# Patient Record
Sex: FEMALE | Race: OTHER | NOT HISPANIC OR LATINO
[De-identification: names, ages, dates, MRNs, and addresses within clinical notes are randomized per-mention and may not be internally consistent; named-entity substitution may affect disease eponyms.]

---

## 2019-06-28 ENCOUNTER — RESULT REVIEW (OUTPATIENT)
Age: 54
End: 2019-06-28

## 2019-09-15 ENCOUNTER — APPOINTMENT (OUTPATIENT)
Dept: MRI IMAGING | Facility: HOSPITAL | Age: 54
End: 2019-09-15

## 2020-09-16 ENCOUNTER — RESULT REVIEW (OUTPATIENT)
Age: 55
End: 2020-09-16

## 2020-09-27 ENCOUNTER — TRANSCRIPTION ENCOUNTER (OUTPATIENT)
Age: 55
End: 2020-09-27

## 2021-04-08 ENCOUNTER — RESULT REVIEW (OUTPATIENT)
Age: 56
End: 2021-04-08

## 2021-09-17 ENCOUNTER — RESULT REVIEW (OUTPATIENT)
Age: 56
End: 2021-09-17

## 2021-11-16 ENCOUNTER — TRANSCRIPTION ENCOUNTER (OUTPATIENT)
Age: 56
End: 2021-11-16

## 2021-11-18 ENCOUNTER — TRANSCRIPTION ENCOUNTER (OUTPATIENT)
Age: 56
End: 2021-11-18

## 2022-04-04 ENCOUNTER — APPOINTMENT (OUTPATIENT)
Dept: ULTRASOUND IMAGING | Facility: CLINIC | Age: 57
End: 2022-04-04

## 2022-04-11 ENCOUNTER — OUTPATIENT (OUTPATIENT)
Dept: OUTPATIENT SERVICES | Facility: HOSPITAL | Age: 57
LOS: 1 days | End: 2022-04-11
Payer: COMMERCIAL

## 2022-04-11 ENCOUNTER — APPOINTMENT (OUTPATIENT)
Dept: ULTRASOUND IMAGING | Facility: CLINIC | Age: 57
End: 2022-04-11
Payer: COMMERCIAL

## 2022-04-11 DIAGNOSIS — R10.11 RIGHT UPPER QUADRANT PAIN: ICD-10-CM

## 2022-04-11 PROCEDURE — 76705 ECHO EXAM OF ABDOMEN: CPT | Mod: 26

## 2022-04-11 PROCEDURE — 76705 ECHO EXAM OF ABDOMEN: CPT

## 2022-09-28 ENCOUNTER — RESULT REVIEW (OUTPATIENT)
Age: 57
End: 2022-09-28

## 2022-11-29 ENCOUNTER — APPOINTMENT (OUTPATIENT)
Dept: ORTHOPEDIC SURGERY | Facility: CLINIC | Age: 57
End: 2022-11-29

## 2022-11-29 VITALS — BODY MASS INDEX: 25.44 KG/M2 | WEIGHT: 149 LBS | HEIGHT: 64 IN

## 2022-11-29 DIAGNOSIS — Z86.39 PERSONAL HISTORY OF OTHER ENDOCRINE, NUTRITIONAL AND METABOLIC DISEASE: ICD-10-CM

## 2022-11-29 PROCEDURE — 99204 OFFICE O/P NEW MOD 45 MIN: CPT | Mod: 25

## 2022-11-29 PROCEDURE — 73110 X-RAY EXAM OF WRIST: CPT | Mod: RT

## 2022-11-29 PROCEDURE — 20550 NJX 1 TENDON SHEATH/LIGAMENT: CPT

## 2022-11-29 NOTE — IMAGING
[de-identified] : Examination of the right hand is as follows: \par Inspection: no ecchymosis, no erythema, no laceration/abrasion, no deformity, no nail deformity, no atrophy. Swelling of the radial side of the wrist just proximal to the radial styloid. \par Tenderness over radial styloid and first dorsal compartment\par ROM: pain with range of motion, but good active flexion and extension of all finger joints. \par Testing: positive Finkelstein's.\par Neuro: motor and sensory function intact in radial, ulnar, and median nerve distribution, no focal motor deficits, light touch intact throughout, palpable radial pulse, good capillary refill in all fingers. G\par \par  [Right] : right wrist [There are no fractures, subluxations or dislocations. No significant abnormalities are seen] : There are no fractures, subluxations or dislocations. No significant abnormalities are seen

## 2023-05-09 ENCOUNTER — APPOINTMENT (OUTPATIENT)
Dept: ORTHOPEDIC SURGERY | Facility: CLINIC | Age: 58
End: 2023-05-09
Payer: COMMERCIAL

## 2023-05-09 VITALS — HEIGHT: 64 IN | BODY MASS INDEX: 25.44 KG/M2 | WEIGHT: 149 LBS

## 2023-05-09 PROCEDURE — 99214 OFFICE O/P EST MOD 30 MIN: CPT | Mod: 25

## 2023-05-09 PROCEDURE — 20550 NJX 1 TENDON SHEATH/LIGAMENT: CPT | Mod: RT

## 2023-05-09 NOTE — IMAGING
[de-identified] : Examination of the right hand is as follows: \par Inspection: no ecchymosis, no erythema, no laceration/abrasion, no deformity, no nail deformity, no atrophy. Swelling of the radial side of the wrist just proximal to the radial styloid. \par Tenderness over radial styloid and first dorsal compartment\par ROM: pain with range of motion, but good active flexion and extension of all finger joints. \par Testing: positive Finkelstein's.\par Neuro: motor and sensory function intact in radial, ulnar, and median nerve distribution, no focal motor deficits, light touch intact throughout, palpable radial pulse, good capillary refill in all fingers. G\par \par  [Right] : right wrist [There are no fractures, subluxations or dislocations. No significant abnormalities are seen] : There are no fractures, subluxations or dislocations. No significant abnormalities are seen

## 2023-05-09 NOTE — HISTORY OF PRESENT ILLNESS
[8] : 8 [2] : 2 [Injection therapy] : injection therapy [de-identified] : 5/9/23:  right De Quervain's pain recurred recently after 1st CSI.\par \par 11/29/22: right wrist pan x 2w, radial sided, with tenderness.\par RHD, retired teacher [FreeTextEntry1] : right wrist [FreeTextEntry5] : Patient received a cortisone which has helped. Pain has recently come back

## 2023-05-09 NOTE — ASSESSMENT
[FreeTextEntry1] : We reviewed the anatomy of the dorsal extensor compartment and pathology of deQuervain's tenosynovitis.  We discussed the treatment options including splinting/nsaids, injection and surgery.  We discussed that too many injections may lead to weakening o the tendon/tendon rupture and the safety of two injections. After a discussion of the risks, benefits and alternatives along with the expectations, the patient was amenable to injection.  The patient understands that it may take 2-5 days to see a noticeable difference.  After sterile prep, injection was performed with 1mL of 1%lidocaine and 6mg of celestone at the first dorsal extensor compartment.  Sterile Band-Aid was applied.\par \par Pt was given 2nd De Quervain's CSI today.

## 2023-05-17 NOTE — ASSESSMENT
[FreeTextEntry1] : We reviewed the anatomy of the dorsal extensor compartment and pathology of deQuervain's tenosynovitis.  We discussed the treatment options including splinting/nsaids, injection and surgery.  We discussed that too many injections may lead to weakening o the tendon/tendon rupture and the safety of two injections. After a discussion of the risks, benefits and alternatives along with the expectations, the patient was amenable to injection.  The patient understands that it may take 2-5 days to see a noticeable difference.  After sterile prep, injection was performed with 1mL of 1%lidocaine and 6mg of celestone at the first dorsal extensor compartment.  Sterile Band-Aid was applied.\par  Detail Level: Detailed

## 2023-08-02 ENCOUNTER — APPOINTMENT (OUTPATIENT)
Dept: ORTHOPEDIC SURGERY | Facility: CLINIC | Age: 58
End: 2023-08-02
Payer: COMMERCIAL

## 2023-08-02 VITALS — WEIGHT: 149 LBS | BODY MASS INDEX: 25.44 KG/M2 | HEIGHT: 64 IN

## 2023-08-02 DIAGNOSIS — Z00.00 ENCOUNTER FOR GENERAL ADULT MEDICAL EXAMINATION W/OUT ABNORMAL FINDINGS: ICD-10-CM

## 2023-08-02 DIAGNOSIS — S96.911A STRAIN OF UNSPECIFIED MUSCLE AND TENDON AT ANKLE AND FOOT LEVEL, RIGHT FOOT, INITIAL ENCOUNTER: ICD-10-CM

## 2023-08-02 PROCEDURE — 99214 OFFICE O/P EST MOD 30 MIN: CPT

## 2023-08-02 PROCEDURE — 73630 X-RAY EXAM OF FOOT: CPT | Mod: RT

## 2023-08-02 RX ORDER — VENLAFAXINE HYDROCHLORIDE 150 MG/1
150 CAPSULE, EXTENDED RELEASE ORAL
Refills: 0 | Status: ACTIVE | COMMUNITY

## 2023-08-02 RX ORDER — ATORVASTATIN CALCIUM 80 MG/1
TABLET, FILM COATED ORAL
Refills: 0 | Status: ACTIVE | COMMUNITY

## 2023-08-02 NOTE — PHYSICAL EXAM
[NL (40)] : plantar flexion 40 degrees [NL 30)] : inversion 30 degrees [NL (20)] : eversion 20 degrees [2+] : posterior tibialis pulse: 2+ [Normal] : saphenous nerve sensation normal [5___] : [facility] 5[unfilled]/5 [] : non-antalgic [Right] : right foot [Weight -] : weightbearing [There are no fractures, subluxations or dislocations. No significant abnormalities are seen] : There are no fractures, subluxations or dislocations. No significant abnormalities are seen [de-identified] : Mild medial arch pain with single heel rise. [de-identified] : Well healed 5th metatarsal shaft fracture with cerclage wire.

## 2023-08-02 NOTE — HISTORY OF PRESENT ILLNESS
[6] : 6 [0] : 0 [Stabbing] : stabbing [de-identified] : Pt is a 58 year old female presenting of right foot pain. Pt states she rolled her right foot/ankle 7/05/23. She saw her podiatrist who gave her a boot which she uses it intermittently.  She no longer has ankle pain, but still has some medial foot pain.   Had a right 5th metatarsal fracture requiring ORIF about 10 years ago. No numbness/tingling. WB in slides. [FreeTextEntry1] : Right Foot

## 2023-08-02 NOTE — ASSESSMENT
[FreeTextEntry1] : Patient has minimal findings at this time. Her intermittent use of the cam boot and slides is likely putting some soft tissue strain in the foot. Wb in supportive sneakers is recommended. She can slowly increase her activity level as tolerated. She will return if there is no improvement in the next 2-3 weeks.

## 2023-10-10 ENCOUNTER — APPOINTMENT (OUTPATIENT)
Dept: ORTHOPEDIC SURGERY | Facility: CLINIC | Age: 58
End: 2023-10-10
Payer: COMMERCIAL

## 2023-10-10 VITALS — WEIGHT: 149 LBS | HEIGHT: 64 IN | BODY MASS INDEX: 25.44 KG/M2

## 2023-10-10 PROCEDURE — 99214 OFFICE O/P EST MOD 30 MIN: CPT | Mod: 25

## 2023-10-10 PROCEDURE — 20550 NJX 1 TENDON SHEATH/LIGAMENT: CPT | Mod: RT

## 2024-03-11 ENCOUNTER — APPOINTMENT (OUTPATIENT)
Dept: ORTHOPEDIC SURGERY | Facility: CLINIC | Age: 59
End: 2024-03-11

## 2024-03-25 ENCOUNTER — APPOINTMENT (OUTPATIENT)
Dept: ORTHOPEDIC SURGERY | Facility: CLINIC | Age: 59
End: 2024-03-25
Payer: COMMERCIAL

## 2024-03-25 VITALS — WEIGHT: 149 LBS | HEIGHT: 64 IN | BODY MASS INDEX: 25.44 KG/M2

## 2024-03-25 DIAGNOSIS — M65.4 RADIAL STYLOID TENOSYNOVITIS [DE QUERVAIN]: ICD-10-CM

## 2024-03-25 PROCEDURE — 99214 OFFICE O/P EST MOD 30 MIN: CPT

## 2024-03-25 NOTE — IMAGING
[de-identified] : Examination of the right hand is as follows: \par  Inspection: no ecchymosis, no erythema, no laceration/abrasion, no deformity, no nail deformity, no atrophy. Swelling of the radial side of the wrist just proximal to the radial styloid. \par  Tenderness over radial styloid and first dorsal compartment\par  ROM: pain with range of motion, but good active flexion and extension of all finger joints. \par  Testing: positive Finkelstein's.\par  Neuro: motor and sensory function intact in radial, ulnar, and median nerve distribution, no focal motor deficits, light touch intact throughout, palpable radial pulse, good capillary refill in all fingers. G\par  \par

## 2024-03-25 NOTE — ASSESSMENT
[FreeTextEntry1] : We again reviewed the anatomy of the first dorsal extensor compartment and DeQuervain's tenosynovitis.  The patient has failed injections/nonoperative treatment.  We discussed the possibility of surgery including the use of an open release.  We discussed that too many injections may lead to weakening of the tendon/tendon rupture and that surgery is indicated at this point.  Risks include bleeding, infection, injury to nerves, vessels, tendons, stiffness, pain, loss of range of motion, loss of function, CRPS, and risks and complications of anesthesia.  We also discussed that there may be some paresthesias after surgery as there are nerve branches on either side of the tendon that are protected during the procedure but occasionally suffer traction while keeping them safe.  This usually, but doesn't always resolve.  We discussed the surgical plan and post op expectations.  We also discussed the possibility of prolonged pain/scar tissue and the possible need for therapy.  The patient is amenable to the risks, had the opportunity to ask questions, all questions were answered and the patient signed consent of their own accord.  They will be contacted by my surgical scheduler.

## 2024-03-25 NOTE — HISTORY OF PRESENT ILLNESS
[de-identified] : 3/25/24:  Radial sided right wrist pain has recurred.  Pt has had 2 CSI.  10/10/23: Pain on radial side of right wrist has recently recurred.  5/9/23:  right De Quervain's pain recurred recently after 1st CSI.  11/29/22: right wrist pan x 2w, radial sided, with tenderness. 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

## 2024-04-19 ENCOUNTER — APPOINTMENT (OUTPATIENT)
Age: 59
End: 2024-04-19

## 2024-05-02 ENCOUNTER — APPOINTMENT (OUTPATIENT)
Dept: ORTHOPEDIC SURGERY | Facility: CLINIC | Age: 59
End: 2024-05-02

## 2024-08-12 ENCOUNTER — APPOINTMENT (OUTPATIENT)
Dept: PULMONOLOGY | Facility: CLINIC | Age: 59
End: 2024-08-12
Payer: COMMERCIAL

## 2024-08-12 VITALS
WEIGHT: 135 LBS | RESPIRATION RATE: 16 BRPM | DIASTOLIC BLOOD PRESSURE: 72 MMHG | HEIGHT: 63.5 IN | BODY MASS INDEX: 23.62 KG/M2 | OXYGEN SATURATION: 98 % | SYSTOLIC BLOOD PRESSURE: 128 MMHG | HEART RATE: 78 BPM | TEMPERATURE: 97.1 F

## 2024-08-12 DIAGNOSIS — J45.20 MILD INTERMITTENT ASTHMA, UNCOMPLICATED: ICD-10-CM

## 2024-08-12 DIAGNOSIS — Z86.16 PERSONAL HISTORY OF COVID-19: ICD-10-CM

## 2024-08-12 DIAGNOSIS — U07.1 COVID-19: ICD-10-CM

## 2024-08-12 DIAGNOSIS — K21.9 GASTRO-ESOPHAGEAL REFLUX DISEASE W/OUT ESOPHAGITIS: ICD-10-CM

## 2024-08-12 DIAGNOSIS — J30.89 OTHER ALLERGIC RHINITIS: ICD-10-CM

## 2024-08-12 DIAGNOSIS — Z80.1 FAMILY HISTORY OF MALIGNANT NEOPLASM OF TRACHEA, BRONCHUS AND LUNG: ICD-10-CM

## 2024-08-12 DIAGNOSIS — Z78.9 OTHER SPECIFIED HEALTH STATUS: ICD-10-CM

## 2024-08-12 DIAGNOSIS — E06.1 SUBACUTE THYROIDITIS: ICD-10-CM

## 2024-08-12 DIAGNOSIS — R06.83 SNORING: ICD-10-CM

## 2024-08-12 DIAGNOSIS — F12.90 CANNABIS USE, UNSPECIFIED, UNCOMPLICATED: ICD-10-CM

## 2024-08-12 DIAGNOSIS — R06.02 SHORTNESS OF BREATH: ICD-10-CM

## 2024-08-12 DIAGNOSIS — F41.9 ANXIETY DISORDER, UNSPECIFIED: ICD-10-CM

## 2024-08-12 DIAGNOSIS — Z86.59 PERSONAL HISTORY OF OTHER MENTAL AND BEHAVIORAL DISORDERS: ICD-10-CM

## 2024-08-12 DIAGNOSIS — Z82.49 FAMILY HISTORY OF ISCHEMIC HEART DISEASE AND OTHER DISEASES OF THE CIRCULATORY SYSTEM: ICD-10-CM

## 2024-08-12 DIAGNOSIS — S96.911A STRAIN OF UNSPECIFIED MUSCLE AND TENDON AT ANKLE AND FOOT LEVEL, RIGHT FOOT, INITIAL ENCOUNTER: ICD-10-CM

## 2024-08-12 DIAGNOSIS — Z82.5 FAMILY HISTORY OF ASTHMA AND OTHER CHRONIC LOWER RESPIRATORY DISEASES: ICD-10-CM

## 2024-08-12 PROCEDURE — 99204 OFFICE O/P NEW MOD 45 MIN: CPT | Mod: 25

## 2024-08-12 PROCEDURE — 94618 PULMONARY STRESS TESTING: CPT

## 2024-08-12 PROCEDURE — 71046 X-RAY EXAM CHEST 2 VIEWS: CPT

## 2024-08-12 PROCEDURE — 94060 EVALUATION OF WHEEZING: CPT

## 2024-08-12 PROCEDURE — 94727 GAS DIL/WSHOT DETER LNG VOL: CPT

## 2024-08-12 PROCEDURE — 94729 DIFFUSING CAPACITY: CPT

## 2024-08-12 PROCEDURE — ZZZZZ: CPT

## 2024-08-12 RX ORDER — ALBUTEROL SULFATE AND BUDESONIDE 90; 80 UG/1; UG/1
90-80 AEROSOL, METERED RESPIRATORY (INHALATION)
Qty: 3 | Refills: 3 | Status: ACTIVE | COMMUNITY
Start: 2024-08-12 | End: 1900-01-01

## 2024-08-12 RX ORDER — OMEPRAZOLE 40 MG/1
40 CAPSULE, DELAYED RELEASE ORAL
Refills: 0 | Status: ACTIVE | COMMUNITY

## 2024-08-12 RX ORDER — OLOPATADINE HYDROCHLORIDE 665 UG/1
0.6 SPRAY, METERED NASAL
Qty: 3 | Refills: 1 | Status: ACTIVE | COMMUNITY
Start: 2024-08-12 | End: 1900-01-01

## 2024-08-12 NOTE — ASSESSMENT
[FreeTextEntry1] : Ms. DUBON is a 59 year old female with a history of recreational marijuana, COVID-19x3 (3/2020, 2021, 2022), De Quervain's tenosynovitis, and panic attacks who now comes to the office for an initial pulmonary evaluation for SOB, likely mild intermittent asthma, ?Paradoxical Vocal Cord dysfunction, underlying allergies, GERD, and ?TERESE    Her shortness of breath is multifactorial due to:   -poor mechanics of breathing -out of shape -pulmonary disease   -likely mild intermittent asthma -cardiac disease    -doubtful    Problem 1: likely mild intermittent asthma -add Airsupra 2 inhalations Q6H PRN -Inhaler technique reviewed as well as oral hygiene techniques reviewed with patient. Avoidance of cold air, extremes of temperature, rescue inhaler should be used before exercise. Order of medication reviewed with patient. Recommended adequate hydration and use of a cool mist humidifier in the bedroom -Asthma is believed to be caused by inherited (genetic) and environmental factor, but its exact cause is unknown. Asthma may be triggered by allergens, lung infections, or irritants in the air. Asthma triggers are different for each person.     Problem 2: Paradoxical Vocal Cord dysfunction -discussed with patient Sx associated with Paradoxical Vocal Cord dysfunction   Problem 3: allergy/sinus (urticaria) -complete blood work: asthma panel, food IgE panel, IgE level, eosinophil level, vitamin D level -add Olopatadine 0.6% 1 sniff BID -Environmental measures for allergies were encouraged including mattress and pillow covers, air purifier, and environmental controls.    Problem 4: GERD -recommended Reflux Gourmet -continue Omeprazole 40 mg QAM, pre-meal -Rule of 2s: avoid eating too much, eating too late, eating too spicy, eating two hours before bed. -Things to avoid including overeating, spicy foods, tight clothing, eating within three hours of bed, this list is not all inclusive. -For treatment of reflux, possible options discussed including diet control, H2 blockers, PPIs, as well as coating motility agents discussed as treatment options. Timing of meals and proximity of last meal to sleep were discussed. If symptoms persist, a formal gastrointestinal evaluation is needed.     Problem 5: ?TERESE (elevated Mallampati class, poor quality sleep, snoring, potential Paradoxical Vocal Cord dysfunction) -complete home sleep study -Sleep apnea is associated with adverse clinical consequences which can affect most organ systems. Cardiovascular disease risk includes arrhythmias, atrial fibrillation, hypertension, coronary artery disease, and stroke. Metabolic disorders include diabetes type 2, non-alcoholic fatty liver disease. Mood disorder especially depression; and cognitive decline especially in the elderly. Associations with chronic reflux/Douglas's esophagus some but not all inclusive. -Reasons include arousal consistent with hypopnea; respiratory events most prominent in REM sleep or supine position; therefore sleep staging and body position are important for accurate diagnosis and estimation of AHI.     Problem 6: poor breathing mechanics -Recommended Faye Mckeon and Radha breathing technique -Proper breathing techniques were reviewed with an emphasis on exhalation. Patient was instructed to breathe in for 1 second and out for 4 seconds. The patient was encouraged to not talk while walking.     Problem 7: out of shape -Exercise, and diet control were discussed and are highly encouraged. Treatment options are given such as aqua therapy, and contacting a nutritionist. Recommended to use the elliptical, stationary bike, less use of the treadmill.     Problem 8: health maintenance -recommended to read "The Gift of Maybe" by Marysol Ramírez (for anxiety) -recommended yearly flu shot after October 15, 2023 -recommended strep pneumonia vaccines: Prevnar-20 vaccine -recommended early intervention for Upper Respiratory Infections (URIs) -recommended regular osteoporosis evaluations -recommended early dermatological evaluations -recommended after the age of 50 to the age of 70, colonoscopy every 5 years     F/P in 6-8 weeks. She is encouraged to call with any changes, concerns, or questions

## 2024-08-12 NOTE — ADDENDUM
[FreeTextEntry1] : Documented by Herrera Toledo acting as a scribe for Dr. Clarence Cortes on 08/12/2024 All medical record entries made by the Scribe were at my, Dr. Clarence Cortes's, direction and personally dictated by me on 08/12/2024 . I have reviewed the chart and agree that the record accurately reflects my personal performance of the history, physical exam, assessment and plan. I have also personally directed, reviewed, and agree with the discharge instructions.

## 2024-08-12 NOTE — REASON FOR VISIT
[Initial] : an initial visit [Parent] : parent [TextBox_44] : SOB, likely mild intermittent asthma, ?Paradoxical Vocal Cord dysfunction, underlying allergies, GERD, and ?TERESE

## 2024-08-12 NOTE — PROCEDURE
[FreeTextEntry1] : CXR (08/12/2024) reveals a normal sized heart; no evidence of infiltrate or effusion--a normal appearing chest radiograph  Full PFT reveals normal flows; FEV1 was 2.68L which is 108% of predicted, with a 12% improvement with BD at mid-low volumes; normal lung volumes; normal diffusion at 24.8, which is 127% of predicted; normal flow volume loop. PFTs were performed to evaluate for SOB  6 minute walk test reveals a low saturation of 91% with moderate evidence of dyspnea or fatigue; walked 440.1 meters

## 2024-08-12 NOTE — PHYSICAL EXAM
[No Acute Distress] : no acute distress [Normal Oropharynx] : normal oropharynx [III] : Mallampati Class: III [Normal Appearance] : normal appearance [No Neck Mass] : no neck mass [Normal Rate/Rhythm] : normal rate/rhythm [Normal S1, S2] : normal s1, s2 [No Murmurs] : no murmurs [No Resp Distress] : no resp distress [Clear to Auscultation Bilaterally] : clear to auscultation bilaterally [No Abnormalities] : no abnormalities [Benign] : benign [Normal Gait] : normal gait [No Clubbing] : no clubbing [No Cyanosis] : no cyanosis [No Edema] : no edema [FROM] : FROM [Normal Color/ Pigmentation] : normal color/ pigmentation [No Focal Deficits] : no focal deficits [Oriented x3] : oriented x3 [Normal Affect] : normal affect [TextBox_68] : I:E: 1:3; mild forced expiratory wheeze

## 2024-08-12 NOTE — HISTORY OF PRESENT ILLNESS
[TextBox_4] : Ms. DUBON is a 59 year old female with a history of recreational marijuana, COVID-19x3 (3/2020, 2021, 2022), De Quervain's tenosynovitis, and panic attacks presenting to the office today for an initial pulmonary evaluation. Her chief complaint is  -she notes several decades ago becoming addicted to inhalers  -she notes SOB, coughing, and wheezing -she notes experiencing dyspnea after extended periods of speaking which induces a panic attack, cough, and more SOB -she denies cold air inducing Sx -she notes URI inducing cough and SOB -she notes perfumes induce cough and SOB -she notes dust induces Sx -she notes itchy eyes and ears -she notes heartburn/reflux controlled with omeprazole -she denies dysphagia -she notes poor quality of sleep -she notes sleeping for 6-7 hours -she notes mild snoring -she notes exercising (TRX) -she notes taking Xanax when experiencing anxiety -she notes losing weight (10 lbs) - Mounjaro  -she notes vision is stable -she notes urticaria    -she denies any headaches, nausea, emesis, fever, chills, sweats, chest pain, chest pressure, palpitations, diarrhea, constipation, dysphagia, vertigo, arthralgias, myalgias, leg swelling, or sour taste in the mouth.

## 2024-09-11 ENCOUNTER — LABORATORY RESULT (OUTPATIENT)
Age: 59
End: 2024-09-11

## 2024-09-19 ENCOUNTER — APPOINTMENT (OUTPATIENT)
Dept: ORTHOPEDIC SURGERY | Facility: CLINIC | Age: 59
End: 2024-09-19
Payer: COMMERCIAL

## 2024-09-19 DIAGNOSIS — M25.562 PAIN IN LEFT KNEE: ICD-10-CM

## 2024-09-19 PROCEDURE — 99214 OFFICE O/P EST MOD 30 MIN: CPT

## 2024-09-19 PROCEDURE — 73562 X-RAY EXAM OF KNEE 3: CPT | Mod: LT

## 2024-09-19 NOTE — ASSESSMENT
[FreeTextEntry1] : The patient was advised of the diagnosis. The natural history of the pathology was explained in full to the patient in layman's terms. All questions were answered. The risks and benefits of surgical and non-surgical treatment alternatives were explained in full to the patient.  therapy and exercise options discussed pt will RTO for therapy script  PRN

## 2024-09-19 NOTE — HISTORY OF PRESENT ILLNESS
[de-identified] :  09/19/2024 :LUCRETIA DUBON , a 59 year old female, presents today for knee pain. Pt injured her left knee while doing lunges at the gym.

## 2024-09-19 NOTE — IMAGING
[de-identified] : left knee: skin intact no deformities NVID FAROM  xray 3 views taken of the left knee show: no fx/dislocations

## 2024-10-14 ENCOUNTER — APPOINTMENT (OUTPATIENT)
Dept: PULMONOLOGY | Facility: CLINIC | Age: 59
End: 2024-10-14
Payer: COMMERCIAL

## 2024-10-14 VITALS
RESPIRATION RATE: 18 BRPM | DIASTOLIC BLOOD PRESSURE: 79 MMHG | OXYGEN SATURATION: 99 % | SYSTOLIC BLOOD PRESSURE: 110 MMHG | HEIGHT: 63.5 IN | WEIGHT: 135 LBS | TEMPERATURE: 97.6 F | HEART RATE: 76 BPM | BODY MASS INDEX: 23.62 KG/M2

## 2024-10-14 DIAGNOSIS — J45.20 MILD INTERMITTENT ASTHMA, UNCOMPLICATED: ICD-10-CM

## 2024-10-14 DIAGNOSIS — K21.9 GASTRO-ESOPHAGEAL REFLUX DISEASE W/OUT ESOPHAGITIS: ICD-10-CM

## 2024-10-14 DIAGNOSIS — J30.89 OTHER ALLERGIC RHINITIS: ICD-10-CM

## 2024-10-14 PROCEDURE — 99214 OFFICE O/P EST MOD 30 MIN: CPT | Mod: 25

## 2024-11-07 ENCOUNTER — APPOINTMENT (OUTPATIENT)
Dept: ORTHOPEDIC SURGERY | Facility: CLINIC | Age: 59
End: 2024-11-07
Payer: COMMERCIAL

## 2024-11-07 VITALS — BODY MASS INDEX: 23.05 KG/M2 | HEIGHT: 64 IN | WEIGHT: 135 LBS

## 2024-11-07 DIAGNOSIS — M70.62 TROCHANTERIC BURSITIS, LEFT HIP: ICD-10-CM

## 2024-11-07 DIAGNOSIS — M25.542 PAIN IN JOINTS OF RIGHT HAND: ICD-10-CM

## 2024-11-07 DIAGNOSIS — M25.541 PAIN IN JOINTS OF RIGHT HAND: ICD-10-CM

## 2024-11-07 DIAGNOSIS — M25.552 PAIN IN LEFT HIP: ICD-10-CM

## 2024-11-07 PROCEDURE — 20551 NJX 1 TENDON ORIGIN/INSJ: CPT | Mod: LT

## 2024-11-07 PROCEDURE — 99213 OFFICE O/P EST LOW 20 MIN: CPT | Mod: 25

## 2024-11-07 PROCEDURE — 73130 X-RAY EXAM OF HAND: CPT | Mod: 50

## 2025-07-15 ENCOUNTER — APPOINTMENT (OUTPATIENT)
Dept: ORTHOPEDIC SURGERY | Facility: CLINIC | Age: 60
End: 2025-07-15
Payer: COMMERCIAL

## 2025-07-15 PROBLEM — M75.101 ROTATOR CUFF SYNDROME OF RIGHT SHOULDER: Status: ACTIVE | Noted: 2025-07-15

## 2025-07-15 PROBLEM — M77.11 LATERAL EPICONDYLITIS OF RIGHT ELBOW: Status: ACTIVE | Noted: 2025-07-15

## 2025-07-15 PROBLEM — D16.01 ENCHONDROMA OF RIGHT HUMERUS: Status: ACTIVE | Noted: 2025-07-15

## 2025-07-15 PROBLEM — M25.511 ACUTE PAIN OF RIGHT SHOULDER: Status: ACTIVE | Noted: 2025-07-15

## 2025-07-15 PROCEDURE — 73030 X-RAY EXAM OF SHOULDER: CPT | Mod: RT

## 2025-07-15 PROCEDURE — 99213 OFFICE O/P EST LOW 20 MIN: CPT

## 2025-07-15 PROCEDURE — 73080 X-RAY EXAM OF ELBOW: CPT | Mod: RT

## 2025-07-15 RX ORDER — CELECOXIB 200 MG/1
200 CAPSULE ORAL
Qty: 30 | Refills: 0 | Status: ACTIVE | COMMUNITY
Start: 2025-07-15 | End: 1900-01-01

## 2025-07-23 ENCOUNTER — APPOINTMENT (OUTPATIENT)
Dept: MRI IMAGING | Facility: CLINIC | Age: 60
End: 2025-07-23

## 2025-07-28 ENCOUNTER — APPOINTMENT (OUTPATIENT)
Dept: ORTHOPEDIC SURGERY | Facility: CLINIC | Age: 60
End: 2025-07-28

## 2025-08-04 ENCOUNTER — RESULT REVIEW (OUTPATIENT)
Age: 60
End: 2025-08-04

## 2025-08-15 ENCOUNTER — RX RENEWAL (OUTPATIENT)
Age: 60
End: 2025-08-15